# Patient Record
Sex: FEMALE | Race: WHITE | NOT HISPANIC OR LATINO | Employment: FULL TIME | URBAN - METROPOLITAN AREA
[De-identification: names, ages, dates, MRNs, and addresses within clinical notes are randomized per-mention and may not be internally consistent; named-entity substitution may affect disease eponyms.]

---

## 2021-03-17 ENCOUNTER — HOSPITAL ENCOUNTER (OUTPATIENT)
Dept: GENERAL RADIOLOGY | Facility: HOSPITAL | Age: 31
Discharge: HOME | End: 2021-03-17
Admitting: PHYSICIAN ASSISTANT

## 2021-03-17 ENCOUNTER — OFFICE VISIT (OUTPATIENT)
Dept: FAMILY MEDICINE CLINIC | Facility: CLINIC | Age: 31
End: 2021-03-17

## 2021-03-17 VITALS
WEIGHT: 138 LBS | SYSTOLIC BLOOD PRESSURE: 110 MMHG | HEART RATE: 68 BPM | RESPIRATION RATE: 18 BRPM | DIASTOLIC BLOOD PRESSURE: 68 MMHG | HEIGHT: 66 IN | TEMPERATURE: 98.1 F | BODY MASS INDEX: 22.18 KG/M2

## 2021-03-17 DIAGNOSIS — G89.29 CHRONIC PAIN OF LEFT ANKLE: Primary | ICD-10-CM

## 2021-03-17 DIAGNOSIS — M79.644 PAIN OF RIGHT THUMB: ICD-10-CM

## 2021-03-17 DIAGNOSIS — M25.572 CHRONIC PAIN OF LEFT ANKLE: Primary | ICD-10-CM

## 2021-03-17 PROCEDURE — 73140 X-RAY EXAM OF FINGER(S): CPT

## 2021-03-17 PROCEDURE — 99203 OFFICE O/P NEW LOW 30 MIN: CPT | Performed by: PHYSICIAN ASSISTANT

## 2021-03-17 NOTE — PROGRESS NOTES
Subjective   Raquel Watts is a 30 y.o. female.     History of Present Illness   Pt presents to establish care. 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 Does plan to move back to [place] in the upcoming years    CC of Left ankle pain since rolled ankle injury in November 2020 while at work   Grade 3 left ankle tear. Lateral side. Believes ATFL Had MRI to confirm  Wears brace at times. Does seem to help. Wears vet wrap at times   Ankle still feels week. 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    Review of Systems   Constitutional: Negative.  Negative for chills, diaphoresis, fatigue and fever.   HENT: Negative.  Negative for congestion, ear discharge, ear pain, hearing loss, nosebleeds, postnasal drip, sinus pressure, sneezing and sore throat.    Eyes: Negative.    Respiratory: Negative.  Negative for cough, chest tightness, shortness of breath and wheezing.    Cardiovascular: Negative.  Negative for chest pain, palpitations and leg swelling.   Gastrointestinal: Negative for abdominal distention, abdominal pain, blood in stool, constipation, diarrhea, nausea and vomiting.   Genitourinary: Negative.  Negative for difficulty urinating, dysuria, flank pain, frequency, hematuria and urgency.   Musculoskeletal: Positive for arthralgias. Negative for back pain, gait problem, joint swelling, myalgias, neck pain and neck stiffness.   Skin: Negative.  Negative for color change, pallor, rash and wound.   Neurological: Negative for dizziness, syncope,  "weakness, light-headedness, numbness and headaches.       Objective    Blood pressure 110/68, pulse 68, temperature 98.1 °F (36.7 °C), resp. rate 18, height 167.6 cm (66\"), weight 62.6 kg (138 lb).     Physical Exam  Vitals and nursing note reviewed.   Constitutional:       Appearance: Normal appearance. She is well-developed.   HENT:      Head: Normocephalic and atraumatic.      Right Ear: External ear normal.      Left Ear: External ear normal.      Nose: Nose normal.   Eyes:      Conjunctiva/sclera: Conjunctivae normal.   Neck:      Thyroid: No thyromegaly.      Trachea: No tracheal deviation.   Cardiovascular:      Rate and Rhythm: Normal rate and regular rhythm.      Heart sounds: Normal heart sounds.   Pulmonary:      Effort: Pulmonary effort is normal. No respiratory distress.      Breath sounds: Normal breath sounds. No wheezing or rales.   Chest:      Chest wall: No tenderness.   Musculoskeletal:         General: No deformity.      Right wrist: Normal.      Right hand: Bony tenderness present. No swelling or deformity. Normal pulse.      Cervical back: Normal range of motion and neck supple.      Right ankle: Normal.      Right Achilles Tendon: Normal.      Left ankle: No swelling, deformity or ecchymosis. Tenderness present over the lateral malleolus and ATF ligament. Decreased range of motion.      Left Achilles Tendon: Normal.      Comments: TTP along base of R thumb   Lymphadenopathy:      Cervical: No cervical adenopathy.   Skin:     General: Skin is warm and dry.   Neurological:      Mental Status: She is alert and oriented to person, place, and time.   Psychiatric:         Behavior: Behavior normal.         Thought Content: Thought content normal.         Judgment: Judgment normal.         Assessment/Plan   Diagnoses and all orders for this visit:    1. Chronic pain of left ankle (Primary)  -     Ambulatory Referral to Physical Therapy Evaluate and treat    2. Pain of right thumb  -     Ambulatory " Referral to Physical Therapy Evaluate and treat  -     XR finger 2+ vw right    referral to Results physiotherapy placed   Check XR of R thumb

## 2021-03-18 ENCOUNTER — TELEPHONE (OUTPATIENT)
Dept: FAMILY MEDICINE CLINIC | Facility: CLINIC | Age: 31
End: 2021-03-18

## 2021-03-18 DIAGNOSIS — M85.649 BONE CYST OF HAND: ICD-10-CM

## 2021-03-18 DIAGNOSIS — M79.644 PAIN OF RIGHT THUMB: Primary | ICD-10-CM

## 2021-03-18 NOTE — PROGRESS NOTES
I have reviewed the notes, assessments, and/or procedures performed by Randy Reveles, I concur with her documentation of Raquel Watts.

## 2021-03-18 NOTE — TELEPHONE ENCOUNTER
Please let patient know XR of thumb is showing suspected tiny degenerative bone cyst. Will order consult with hand specialist due to ongoing discomfort

## 2021-04-01 ENCOUNTER — OFFICE VISIT (OUTPATIENT)
Dept: ORTHOPEDIC SURGERY | Facility: CLINIC | Age: 31
End: 2021-04-01

## 2021-04-01 VITALS
HEIGHT: 66 IN | BODY MASS INDEX: 22.18 KG/M2 | WEIGHT: 138.01 LBS | HEART RATE: 62 BPM | SYSTOLIC BLOOD PRESSURE: 118 MMHG | DIASTOLIC BLOOD PRESSURE: 67 MMHG

## 2021-04-01 DIAGNOSIS — S69.91XA INJURY OF RIGHT THUMB, INITIAL ENCOUNTER: ICD-10-CM

## 2021-04-01 DIAGNOSIS — M79.644 PAIN OF RIGHT THUMB: Primary | ICD-10-CM

## 2021-04-01 PROCEDURE — 99203 OFFICE O/P NEW LOW 30 MIN: CPT | Performed by: ORTHOPAEDIC SURGERY

## 2021-04-01 NOTE — PROGRESS NOTES
[facility] [place] [facility] Note        Subjective     Pain of the Right Thumb      HPI    Raquel Watts is a 30 y.o. female who presents with new problem of: right thumb pain.  Onset: mechanical fall. The issue has been ongoing for 2 month(s). Pain is a 2/10 on the pain scale. Pain is described as aching. Associated symptoms include pain. The pain is worse with any movement of the joint; resting improve the pain. Previous treatments have included: NSAIDS and bracing.    I have reviewed the following portions of the patient's history:History of Present Illness and review of systems.      Patient is here today for an injury to her right thumb.  This happened about 2 months ago when she was working at the [employer].  Since that time, the patient has difficulty gripping and pinching.  She feels like the thumb is unstable on aches when she has to do twisting and gripping and pinching.  She has had no treatment thus far.  She is left-hand dominant.    History reviewed. No pertinent past medical history.   No past surgical history on file.   History reviewed. No pertinent family history.  Social History     Socioeconomic History   • Marital status: Single     Spouse name: Not on file   • Number of children: Not on file   • Years of education: Not on file   • Highest education level: Not on file   Tobacco Use   • Smoking status: Never Smoker   • Smokeless tobacco: Never Used      No current outpatient medications on file prior to visit.     No current facility-administered medications on file prior to visit.      No Known Allergies       Review of Systems   Constitutional: Negative.    HENT: Negative.    Eyes: Negative.    Respiratory: Negative.    Cardiovascular: Negative.    Gastrointestinal: Negative.    Endocrine: Negative.    Genitourinary: Negative.    Musculoskeletal: Positive for arthralgias.   Skin: Negative.    Allergic/Immunologic: Negative.    Neurological: Negative.   "  Hematological: Negative.    Psychiatric/Behavioral: Negative.         I reviewed the patient's chief complaint, history of present illness, review of systems, past medical history, surgical history, family history, social history, medications and allergy list.        Objective      Physical Exam  /67   Pulse 62   Ht 167.6 cm (65.98\")   Wt 62.6 kg (138 lb 0.1 oz)   BMI 22.29 kg/m²     Body mass index is 22.29 kg/m².    General  Mental Status - alert  General Appearance - cooperative, well groomed, not in acute distress  Orientation - Oriented X3  Build & Nutrition - well developed and well nourished  Posture - normal posture  Gait - normal gait       Ortho Exam  Right thumb: Negative CMC shuck and grind.  There is not gross instability noted at the MCP compared to her contralateral side.  She is a little bit tender about the volar capsule in the region of the A1 pulley.  Negative Finkelstein's.  She has pain with resisted EPL but 5 out of 5 strength there.    Imaging/Studies  Imaging Results (Last 24 Hours)     ** No results found for the last 24 hours. **      XR finger 2+ vw right  Narrative: EXAMINATION: XR FINGER 2+ VW RIGHT-     INDICATION: M79.644-Pain in right finger(s).     COMPARISON: None.     FINDINGS: History indicates right thumb pain, nonspecified injury.     Bones of the right thumb appear anatomically aligned and intact. Small  sesamoids are seen of both the MCP joint and IP joint. Slight lucency of  the base of the distal phalanx, volar aspect is favored to represent a  small degenerative cyst here, rather than an avulsion site. No potential  avulsion or evidence of foreign body is identified elsewhere.     Impression: Slight irregularity of the base of the distal first phalanx,  volar aspect, suspected to represent a tiny degenerative bone cyst. Old  avulsion site is considered less likely. Please correlate with site of  patient's injury. No evidence of potential trauma elsewhere.    "   D:  03/18/2021  E:  03/18/2021     This report was finalized on 3/18/2021 10:01 PM by Dr. Jarrod Priest MD.       We reviewed images and report of the patient's right thumb x-ray.    Assessment    Assessment:  1. Pain of right thumb    2. Injury of right thumb, initial encounter        Plan:  1. Continue over-the-counter medication as needed for discomfort  2. Right thumb injury--patient clearly has a ligamentous injury to the right thumb.  Is difficult for me to tell whether this is a volar plate plus or minus ulnar collateral ligament injury.  MRI of the thumb will be requested.  I will see her back to review the study.        Darryn Grey MD  04/01/21  14:34 EDT    Dragon disclaimer:  Much of this encounter note is an electronic transcription/translation of spoken language to printed text. The electronic translation of spoken language may permit erroneous, or at times, nonsensical words or phrases to be inadvertently transcribed; Although I have reviewed the note for such errors, some may still exist.

## 2021-04-22 ENCOUNTER — HOSPITAL ENCOUNTER (OUTPATIENT)
Dept: MRI IMAGING | Facility: HOSPITAL | Age: 31
Discharge: HOME | End: 2021-04-22
Admitting: ORTHOPAEDIC SURGERY

## 2021-04-22 DIAGNOSIS — M79.644 PAIN OF RIGHT THUMB: ICD-10-CM

## 2021-04-22 DIAGNOSIS — S69.91XA INJURY OF RIGHT THUMB, INITIAL ENCOUNTER: ICD-10-CM

## 2021-04-22 PROCEDURE — 73218 MRI UPPER EXTREMITY W/O DYE: CPT

## 2021-05-17 ENCOUNTER — TELEPHONE (OUTPATIENT)
Dept: ORTHOPEDIC SURGERY | Facility: CLINIC | Age: 31
End: 2021-05-17

## 2021-05-17 NOTE — TELEPHONE ENCOUNTER
Caller: PATIENT     Reason for call:  PATIENT MISSED LAST APPT FOR BEING ONCALL AT WORK, SHE HAD MRI DONE.   SHE IS ASKING IF A TELEHEALTH VISIT IS AN OPTION DUE TO WORK SCHEDULE.    PLEASE ADVISE,     Caller# [phone]   WORK-

## 2021-05-18 NOTE — TELEPHONE ENCOUNTER
LVM for patient to call back for results and to let her know that we cannot do a telehealth visit for imaging follow up

## 2021-05-18 NOTE — TELEPHONE ENCOUNTER
Cannot do telehealth visits for imaging follow-up.  It is okay for you to call her with the results and have her call back if she has any questions.  I do not see a volar plate issue.  She has a radial collateral sprain that should heal with time.  She just needs to brace and prevent further reinjuries.  Have her call if she needs anything.    CINDY

## 2021-05-20 NOTE — TELEPHONE ENCOUNTER
I spoke with the patient, she would like a follow up with Dr. Grey. I scheduled her for a follow up.

## 2021-06-10 ENCOUNTER — OFFICE VISIT (OUTPATIENT)
Dept: ORTHOPEDIC SURGERY | Facility: CLINIC | Age: 31
End: 2021-06-10

## 2021-06-10 VITALS
BODY MASS INDEX: 19.7 KG/M2 | SYSTOLIC BLOOD PRESSURE: 132 MMHG | DIASTOLIC BLOOD PRESSURE: 77 MMHG | WEIGHT: 122.6 LBS | HEART RATE: 62 BPM | HEIGHT: 66 IN

## 2021-06-10 DIAGNOSIS — M79.644 PAIN OF RIGHT THUMB: Primary | ICD-10-CM

## 2021-06-10 DIAGNOSIS — S69.91XD INJURY OF RIGHT THUMB, SUBSEQUENT ENCOUNTER: ICD-10-CM

## 2021-06-10 PROCEDURE — 99213 OFFICE O/P EST LOW 20 MIN: CPT | Performed by: ORTHOPAEDIC SURGERY

## 2021-06-10 NOTE — PROGRESS NOTES
"    [facility] [place] [facility] Note        Subjective     CC: Follow-up (MRi Right hand 4/22/21)      HPI    Raquel Watts is a 31 y.o. female.  Patient returns to the office today for follow-up of her right thumb injury and follow-up on the results of an MRI of her right knee.  She tells me that overall the thumb is improving but it does hurt when she gets it caught in just the right position.    Overall, patient's symptoms are as above.  She is moving back to [place] in a few weeks.    ROS:    Constiutional:Pt denies fever, chills, nausea, or vomiting.  MSK:as above        Objective      Past Medical History  History reviewed. No pertinent past medical history.      Physical Exam  /77   Pulse 62   Ht 167.6 cm (65.98\")   Wt 55.6 kg (122 lb 9.6 oz)   BMI 19.80 kg/m²     Body mass index is 19.8 kg/m².    Patient is well nourished and well developed.        Ortho Exam  Right thumb: Patient is stable to varus and valgus at 0 and 30 degrees at the IP joint and MCP joint  Patient has minimal tenderness or instability along the volar plate    Imaging/Labs/EMG Reviewed:  Imaging Results (Last 24 Hours)     ** No results found for the last 24 hours. **      MRI Hand Right Without Contrast  Narrative: The MRI Hand RT WO    INDICATION:    Injury to right hand several months ago. Pain and weakness primarily at the first MCP joint. Pain when hyperextending thumb. Clinical concern for volar plate or ulnar collateral ligament injury.    TECHNIQUE:   MRI of the right hand attention thumb without IV contrast. Patient originally scanned on 4/22/2021 but examination was nondiagnostic for the pathology of concern and patient was brought back on 5/7/2021 for dedicated imaging of the thumb.    COMPARISON:    Right thumb series 3/17/2021    FINDINGS:  Normal osseous alignment. No focal marrow edema identified to suggest bone contusion or occult fracture. Joint spaces appear maintained.    Mild thickening and edema " involving the radial collateral ligament of the thumb with overlying soft tissue edema compatible with radial collateral ligament sprain. No convincing evidence of high-grade injury. The ulnar collateral ligament appears intact.  The volar plate appears intact. Extensor and flexor tendons appear normal. Edema within the abductor pollicis brevis muscle likely the sequela of low-grade muscle strain.  Impression: Mild thickening and edema within the proximal radial collateral ligament of the thumb compatible with ligamentous sprain. No evidence of high-grade injury.    Edema within the abductor pollicis brevis muscle may represent low-grade muscle strain.    No occult fracture. Volar plate and ulnar collateral ligament appear intact.    Signer Name: KEHINDE Murray MD   Signed: 5/7/2021 2:22 PM   Workstation Name: [id number]    [facility]    Reviewed images and report of the MRI above.      Assessment    Assessment:  1. Pain of right thumb    2. Injury of right thumb, subsequent encounter        Plan:  1. Recommend over the counter anti-inflammatories for pain and/or swelling  2. Right thumb injury with radial collateral ligament sprain--patient symptoms are minimal and positional.  We will offer her a neoprene brace or have given her some advice on how to successfully taper thumb to keep her out of hyperextension injury at the MCP.  Copy of her disc will be burned for her to take back to [place] in case this bothers her down the road.  Would recommend against surgery currently.      Darryn Grey MD  06/10/21  12:40 SCARLETTT      Dragon disclaimer:  Much of this encounter note is an electronic transcription/translation of spoken language to printed text. The electronic translation of spoken language may permit erroneous, or at times, nonsensical words or phrases to be inadvertently transcribed; Although I have reviewed the note for such errors, some may still exist.